# Patient Record
Sex: MALE | Race: WHITE
[De-identification: names, ages, dates, MRNs, and addresses within clinical notes are randomized per-mention and may not be internally consistent; named-entity substitution may affect disease eponyms.]

---

## 2018-09-15 ENCOUNTER — HOSPITAL ENCOUNTER (EMERGENCY)
Dept: HOSPITAL 50 - VM.ED | Age: 53
Discharge: HOME | End: 2018-09-15
Payer: COMMERCIAL

## 2018-09-15 DIAGNOSIS — L50.9: Primary | ICD-10-CM

## 2018-09-15 DIAGNOSIS — Z79.899: ICD-10-CM

## 2018-09-15 PROCEDURE — 96372 THER/PROPH/DIAG INJ SC/IM: CPT

## 2018-09-15 PROCEDURE — 99283 EMERGENCY DEPT VISIT LOW MDM: CPT

## 2018-09-15 NOTE — EDM.PDOC
ED HPI GENERAL MEDICAL PROBLEM





- General


Stated Complaint: HIVES


Time Seen by Provider: 09/15/18 13:30


Source of Information: Reports: Patient


History Limitations: Reports: No Limitations





- History of Present Illness


INITIAL COMMENTS - FREE TEXT/NARRATIVE: 


Patient comes into the emergency department with hives starting approximately 

an hour ago. Patient does have a long-standing history of breaking out in hives 

related to stress. He's been under a lot of stress lately-his wife just had 

surgery and his daughter who is a senior playing volleyball today. He noticed 

prior to eating lunch that he started breaking out in hives. He did take 2 

Benadryl prior to arrival however it did not help. Patient denies any shortness 

of breath, chest pain, numbness and tingling in the mouth, or inability to take 

a deep breath. Patient states that he last broke out in hives approximately 

week ago and they resolved after 24 hours.





Onset: Today, Sudden


Quality: Reports: Ache


Severity: Mild


Improves with: Reports: None


Worsens with: Reports: None


Associated Symptoms: Reports: No Other Symptoms





- Related Data


 Allergies











Allergy/AdvReac Type Severity Reaction Status Date / Time


 


No Known Allergies Allergy   Verified 09/15/18 14:26











Home Meds: 


 Home Meds





Tamsulosin HCl [Flomax] 0.4 mg DAILY 09/15/18 [History]


predniSONE 10 mg PO BID 4 Days #8 tablet 09/15/18 [Rx]











ED ROS ALLERGIC REACTION





- Review of Systems


Review Of Systems: See Below


Constitutional: Reports: No Symptoms


HEENT: Reports: No Symptoms


Respiratory: Reports: No Symptoms


Cardiovascular: Reports: No Symptoms


Endocrine: Reports: No Symptoms


GI/Abdominal: Reports: No Symptoms


: Reports: No Symptoms


Musculoskeletal: Reports: No Symptoms


Skin: Reports: Urticaria


Neurological: Reports: No Symptoms


Psychiatric: Reports: No Symptoms


Hematologic/Lymphatic: Reports: No Symptoms


Immunologic: Reports: No Symptoms





ED EXAM GENERAL NO PERIP PULSE





- Physical Exam


Exam: See Below


Exam Limited By: No Limitations


General Appearance: Alert, WD/WN, No Apparent Distress


Eye Exam: Bilateral Eye: EOMI, PERRL


Nose: Normal Inspection, Normal Mucosa, No Blood


Throat/Mouth: Normal Inspection, Normal Teeth, Normal Gums, Normal Oropharynx, 

Normal Voice, No Airway Compromise


Head: Atraumatic, Normocephalic


Neck: Normal Inspection, Supple, Non-Tender, Full Range of Motion


Respiratory/Chest: No Respiratory Distress, Lungs Clear, Normal Breath Sounds, 

No Accessory Muscle Use, Chest Non-Tender


Cardiovascular: Normal Peripheral Pulses, Regular Rate, Rhythm, No Edema


GI/Abdominal: Normal Bowel Sounds, Soft, Non-Tender, No Distention


Back Exam: Normal Inspection, Full Range of Motion


Extremities: Normal Inspection, Normal Range of Motion, Non-Tender, No Pedal 

Edema, Normal Capillary Refill


Neurological: Alert, Oriented, Normal Cognition, Normal Gait


Psychiatric: Normal Affect, Normal Mood


Skin Exam: Rash (generalized thoughout upper body )





Course





- Vital Signs


Last Recorded V/S: 


 Last Vital Signs











Temp  37.1 C   09/15/18 13:25


 


Pulse  101 H  09/15/18 13:25


 


Resp  18   09/15/18 13:25


 


BP  109/76   09/15/18 13:25


 


Pulse Ox  94 L  09/15/18 13:25














- Orders/Labs/Meds


Meds: 


Medications














Discontinued Medications














Generic Name Dose Route Start Last Admin





  Trade Name Freq  PRN Reason Stop Dose Admin


 


Famotidine  20 mg  09/15/18 14:36  09/15/18 14:59





  Pepcid  PO  09/15/18 14:37  20 mg





  ONETIME ONE   Administration





     





     





     





     


 


Hydroxyzine HCl  50 mg  09/15/18 13:37  09/15/18 13:49





  Vistaril  IM  09/15/18 13:38  50 mg





  ONETIME ONE   Administration





     





     





     





     


 


Methylprednisolone Sodium Succinate  125 mg  09/15/18 13:37  09/15/18 13:49





  Solu-Medrol  IM  09/15/18 13:38  125 mg





  ONETIME ONE   Administration





     





     





     





     














Departure





- Departure


Time of Disposition: 15:10


Disposition: Home, Self-Care 01


Condition: Good


Clinical Impression: 


 Hives of unknown origin








- Discharge Information


*PRESCRIPTION DRUG MONITORING PROGRAM REVIEWED*: Not Applicable


*COPY OF PRESCRIPTION DRUG MONITORING REPORT IN PATIENT ROBBY: Not Applicable


Prescriptions: 


predniSONE 10 mg PO BID 4 Days #8 tablet


Instructions:  Hives


Referrals: 


PCP,Not In Area [Primary Care Provider] - 


Forms:  ED Department Discharge


Additional Instructions: 


1. rest


2. increase water intake


3. Take Benadryl every 4 hours for the next 24 hours to help reduce another 

flar of hives


4. Try to reduce the stress in your life where possible


5. Follow up with PCP if not better in 2-3 days 


6. Activity and diet as tolerated








- Assessment/Plan


Assessment:: 


1. Urticaria 





Plan: 





1. Hydroxyzine IM given in the ER 


2. Solumedrol IM given in ER 


3. Pt advised to take Benadryl every 4 hours for the next 24 hours


4. Did offer to admit for observation however he declined and would monitor on 

his own and return to ER if need be


5. Did recommend follow up with PCP in 2 days if not better 


6. Education regarding activity and diet provided


7. Take Zantac daily for 1 month


8. Prednisone 10mg BID 5 days